# Patient Record
Sex: FEMALE | Race: WHITE | NOT HISPANIC OR LATINO | URBAN - METROPOLITAN AREA
[De-identification: names, ages, dates, MRNs, and addresses within clinical notes are randomized per-mention and may not be internally consistent; named-entity substitution may affect disease eponyms.]

---

## 2023-07-25 ENCOUNTER — TELEPHONE (OUTPATIENT)
Dept: PRIMARY CARE | Facility: CLINIC | Age: 58
End: 2023-07-25

## 2023-07-25 NOTE — TELEPHONE ENCOUNTER
Pt is due for her mammogram. According to her chart, she has never had one.    Please let me know when this is ordered so I can call pt.

## 2025-02-17 ENCOUNTER — HOSPITAL ENCOUNTER (OUTPATIENT)
Dept: RADIOLOGY | Facility: CLINIC | Age: 60
Discharge: HOME | End: 2025-02-17
Payer: COMMERCIAL

## 2025-02-17 VITALS — WEIGHT: 260.14 LBS | HEIGHT: 65 IN | BODY MASS INDEX: 43.34 KG/M2

## 2025-02-17 DIAGNOSIS — Z12.31 ENCOUNTER FOR SCREENING MAMMOGRAM FOR MALIGNANT NEOPLASM OF BREAST: ICD-10-CM

## 2025-02-17 PROCEDURE — 77063 BREAST TOMOSYNTHESIS BI: CPT | Performed by: STUDENT IN AN ORGANIZED HEALTH CARE EDUCATION/TRAINING PROGRAM

## 2025-02-17 PROCEDURE — 77067 SCR MAMMO BI INCL CAD: CPT | Performed by: STUDENT IN AN ORGANIZED HEALTH CARE EDUCATION/TRAINING PROGRAM

## 2025-02-17 PROCEDURE — 77067 SCR MAMMO BI INCL CAD: CPT

## 2025-03-20 ENCOUNTER — APPOINTMENT (OUTPATIENT)
Dept: OBSTETRICS AND GYNECOLOGY | Facility: CLINIC | Age: 60
End: 2025-03-20
Payer: COMMERCIAL

## 2025-03-20 VITALS — WEIGHT: 256 LBS | BODY MASS INDEX: 42.6 KG/M2 | DIASTOLIC BLOOD PRESSURE: 104 MMHG | SYSTOLIC BLOOD PRESSURE: 175 MMHG

## 2025-03-20 DIAGNOSIS — Z12.11 COLON CANCER SCREENING: Primary | ICD-10-CM

## 2025-03-20 DIAGNOSIS — Z01.411 ENCOUNTER FOR WELL WOMAN EXAM WITH ABNORMAL FINDINGS: ICD-10-CM

## 2025-03-20 DIAGNOSIS — N95.2 ATROPHIC VULVOVAGINITIS: ICD-10-CM

## 2025-03-20 DIAGNOSIS — Z12.31 VISIT FOR SCREENING MAMMOGRAM: ICD-10-CM

## 2025-03-20 DIAGNOSIS — Z01.419 WELL WOMAN EXAM: ICD-10-CM

## 2025-03-20 PROCEDURE — 99386 PREV VISIT NEW AGE 40-64: CPT | Performed by: OBSTETRICS & GYNECOLOGY

## 2025-03-20 RX ORDER — NYSTATIN AND TRIAMCINOLONE ACETONIDE 100000; 1 [USP'U]/G; MG/G
OINTMENT TOPICAL EVERY 24 HOURS
Qty: 60 G | Refills: 11 | Status: SHIPPED | OUTPATIENT
Start: 2025-03-20

## 2025-03-20 RX ORDER — ESTRADIOL 0.1 MG/G
1 CREAM VAGINAL 2 TIMES WEEKLY
Qty: 42 G | Refills: 3 | Status: SHIPPED | OUTPATIENT
Start: 2025-03-20

## 2025-03-20 NOTE — PROGRESS NOTES
Last pap: 2017, hx of hysterectomy   Last mamm: 2025  Sexually active: Yes  Self breast exam: Yes      CC:    Chief Complaint   Patient presents with    Annual Exam         HPI:  Omaira Keene is here for a routine GYN examination.   S/p hysterectomy, still has ovaries, in menopause.  Having vaginal dryness, urinary urgency and vulvar irritation.  No bleeding, no hematuria.  No dyuria.  Does not feel like UTI.  Occasional incontinence.  Due for colonoscopy needs PCP.  Had normal mammogram in feb.        ROS:    GI - no hematochezia/constipation/diarrhea  URO - no hematuria/dysuria/urinary frequency  GYN - no vaginal discharge/dyspareunia/dysmenorrhea/pelvic pain  PSYCH - mood OK    PMH:   Past Medical History:   Diagnosis Date    Carpal tunnel syndrome, bilateral upper limbs 2016    Bilateral carpal tunnel syndrome    Paresthesia of skin 2016    Tingling    Personal history of colonic polyps     History of adenomatous polyp of colon    Personal history of other diseases of the musculoskeletal system and connective tissue     History of degenerative disc disease    Personal history of other specified conditions     History of atypical nevus       PSH:   Past Surgical History:   Procedure Laterality Date     SECTION, CLASSIC  2016     Section    OOPHORECTOMY  2018    OTHER SURGICAL HISTORY  2019    Hysterectomy       OB History    Para Term  AB Living   1 1 1         SAB IAB Ectopic Multiple Live Births                  # Outcome Date GA Lbr Beck/2nd Weight Sex Type Anes PTL Lv   1 Term              1 c/s  Soc:   Social History     Social History Narrative    Not on file     Occupation/education: retired,  from home      Fam Hx: No family history on file.      PHYSICAL EXAM:  BP (!) 175/104 (BP Location: Left arm, Patient Position: Sitting)   Wt 116 kg (256 lb)   BMI 42.60 kg/m²   GEN:  A&O, NAD  HEENT  head NC/AT, conjunctiva clear, no  visible goiter  CV:  regular pulse rate and rhythm, no visible JVD  RESP:  symmetric respirations, no audible wheezing  ABD:  NT/ND, soft, no palpable masses  URO:  normal urethra, no bladder TTP  GYN:    EGBUS: labia major slightly erythematous symmetric into genitocrural fold, vagina atrophic first degree rectocele, vaginal cuff normal nontender, adnexa mobile and NT/NE  BREAST:  no masses or TTP, no skin lesions or nipple discharge  DERM:  no hirsutism or acne   PSYCH:  normal affect, non-anxious        IMPRESSION/PLAN:  Problem List Items Addressed This Visit    None  Visit Diagnoses       Colon cancer screening    -  Primary    Relevant Orders    Colonoscopy Screening; Average Risk Patient    Visit for screening mammogram        Relevant Orders    BI mammo bilateral screening tomosynthesis    Well woman exam        Relevant Orders    BI mammo bilateral screening tomosynthesis    Atrophic vulvovaginitis        Relevant Medications    nystatin-triamcinolone (Mycolog II) ointment    estradiol (Estrace) 0.01 % (0.1 mg/gram) vaginal cream          D/w pt vaginal atrophy affects urinary urgency.  Will start with vaginal estrogen, nystatin to vulvar.  Fu 4 weeks if not improving consider pelvic floor therapy, ua/culture  Referred to PCP for HTN    Yesenia Coe MD

## 2025-03-22 ENCOUNTER — TELEPHONE (OUTPATIENT)
Dept: GASTROENTEROLOGY | Facility: CLINIC | Age: 60
End: 2025-03-22
Payer: COMMERCIAL

## 2025-04-04 PROBLEM — Z15.89 HLA B27 POSITIVE: Status: ACTIVE | Noted: 2025-04-04

## 2025-04-04 PROBLEM — D50.9 IRON DEFICIENCY ANEMIA: Status: ACTIVE | Noted: 2025-04-04

## 2025-04-04 PROBLEM — I10 ESSENTIAL HYPERTENSION: Status: ACTIVE | Noted: 2025-04-04

## 2025-04-04 PROBLEM — R73.03 PREDIABETES: Status: ACTIVE | Noted: 2025-04-04

## 2025-04-04 PROBLEM — E04.2 MULTINODULAR GOITER: Status: ACTIVE | Noted: 2025-04-04

## 2025-04-04 PROBLEM — G47.30 SLEEP APNEA IN ADULT: Status: ACTIVE | Noted: 2025-04-04

## 2025-04-08 ENCOUNTER — APPOINTMENT (OUTPATIENT)
Dept: PRIMARY CARE | Facility: CLINIC | Age: 60
End: 2025-04-08
Payer: COMMERCIAL

## 2025-04-08 VITALS
RESPIRATION RATE: 18 BRPM | HEART RATE: 74 BPM | BODY MASS INDEX: 43.49 KG/M2 | HEIGHT: 65 IN | WEIGHT: 261 LBS | SYSTOLIC BLOOD PRESSURE: 160 MMHG | TEMPERATURE: 98.2 F | DIASTOLIC BLOOD PRESSURE: 92 MMHG | OXYGEN SATURATION: 98 %

## 2025-04-08 DIAGNOSIS — E66.813 CLASS 3 SEVERE OBESITY DUE TO EXCESS CALORIES WITH SERIOUS COMORBIDITY AND BODY MASS INDEX (BMI) OF 40.0 TO 44.9 IN ADULT: Primary | ICD-10-CM

## 2025-04-08 DIAGNOSIS — K80.20 CALCULUS OF GALLBLADDER WITHOUT CHOLECYSTITIS WITHOUT OBSTRUCTION: ICD-10-CM

## 2025-04-08 DIAGNOSIS — Z23 NEED FOR ZOSTER VACCINATION: ICD-10-CM

## 2025-04-08 DIAGNOSIS — I10 PRIMARY HYPERTENSION: ICD-10-CM

## 2025-04-08 DIAGNOSIS — Z01.83 BLOOD TYPING ENCOUNTER: ICD-10-CM

## 2025-04-08 DIAGNOSIS — J45.20 MILD INTERMITTENT ASTHMA WITHOUT COMPLICATION (HHS-HCC): ICD-10-CM

## 2025-04-08 DIAGNOSIS — Z15.89 HLA B27 POSITIVE: ICD-10-CM

## 2025-04-08 DIAGNOSIS — Z00.00 HEALTHCARE MAINTENANCE: ICD-10-CM

## 2025-04-08 DIAGNOSIS — E66.01 CLASS 3 SEVERE OBESITY DUE TO EXCESS CALORIES WITH SERIOUS COMORBIDITY AND BODY MASS INDEX (BMI) OF 40.0 TO 44.9 IN ADULT: Primary | ICD-10-CM

## 2025-04-08 DIAGNOSIS — R73.03 PREDIABETES: ICD-10-CM

## 2025-04-08 PROBLEM — E88.810 METABOLIC SYNDROME: Status: ACTIVE | Noted: 2025-04-08

## 2025-04-08 PROCEDURE — 3077F SYST BP >= 140 MM HG: CPT

## 2025-04-08 PROCEDURE — 86900 BLOOD TYPING SEROLOGIC ABO: CPT

## 2025-04-08 PROCEDURE — 90471 IMMUNIZATION ADMIN: CPT

## 2025-04-08 PROCEDURE — 90750 HZV VACC RECOMBINANT IM: CPT

## 2025-04-08 PROCEDURE — 3080F DIAST BP >= 90 MM HG: CPT

## 2025-04-08 PROCEDURE — 86901 BLOOD TYPING SEROLOGIC RH(D): CPT

## 2025-04-08 PROCEDURE — 3008F BODY MASS INDEX DOCD: CPT

## 2025-04-08 PROCEDURE — 99204 OFFICE O/P NEW MOD 45 MIN: CPT

## 2025-04-08 RX ORDER — ALBUTEROL SULFATE 90 UG/1
2 INHALANT RESPIRATORY (INHALATION) EVERY 4 HOURS PRN
Qty: 8.5 G | Refills: 1 | Status: SHIPPED | OUTPATIENT
Start: 2025-04-08 | End: 2026-04-08

## 2025-04-08 RX ORDER — LOSARTAN POTASSIUM 50 MG/1
50 TABLET ORAL DAILY
Qty: 30 TABLET | Refills: 0 | Status: SHIPPED | OUTPATIENT
Start: 2025-04-08

## 2025-04-08 ASSESSMENT — PATIENT HEALTH QUESTIONNAIRE - PHQ9
1. LITTLE INTEREST OR PLEASURE IN DOING THINGS: NOT AT ALL
2. FEELING DOWN, DEPRESSED OR HOPELESS: NOT AT ALL
SUM OF ALL RESPONSES TO PHQ9 QUESTIONS 1 AND 2: 0

## 2025-04-08 NOTE — PROGRESS NOTES
"Subjective   Patient ID: Omaira Keene is a 59 y.o. female who presents for Establish Care (New patient to establish. /Patient is fasting.).    Patient here today to establish care.  Blood pressure elevated at today's visit, patient does state that she has had a history of blood pressures previously.    Hx of gestational HTN: on labetalol post-partum.  However otherwise had not been treated for other hypertension.  Denies any associated symptoms: No headache, SOB, CP, visual changes.  Does not check blood pressure at home.  Other stated concerns include weight related concerns       Review of Systems    Objective   BP (!) 160/92 (BP Location: Right arm, Patient Position: Sitting)   Pulse 74   Temp 36.8 °C (98.2 °F)   Resp 18   Ht 1.651 m (5' 5\")   Wt 118 kg (261 lb)   SpO2 98%   BMI 43.43 kg/m²     Physical Exam    Assessment/Plan   {Assess/PlanSmartLinks:98271}       " A1c (Completed)    Follow Up In [facility] - PCP    HLA B27 positive Z15.89    Calculus of gallbladder without cholecystitis without obstruction K80.20    Mild intermittent asthma without complication ([facility]) J45.20    Relevant Medications    albuterol (ProAir HFA) 90 mcg/actuation inhaler    Class 3 severe obesity due to excess calories with serious comorbidity and body mass index (BMI) of 40.0 to 44.9 in adult - Primary E66.813, E66.01, Z68.41    Relevant Orders    Lipid Panel (Completed)    TSH with reflex to Free T4 if abnormal (Completed)     Other Visit Diagnoses         Codes      Primary hypertension     I10    Relevant Medications    losartan (Cozaar) 50 mg tablet    Other Relevant Orders    TSH with reflex to Free T4 if abnormal (Completed)    Comprehensive Metabolic Panel (Completed)    CBC and Auto Differential (Completed)      Need for zoster vaccination     Z23    Relevant Orders    Zoster vaccine, recombinant, adult (SHINGRIX) (Completed)      Blood typing encounter     Z01.83    Relevant Orders    ABO/Rh (Completed)      Healthcare maintenance     Z00.00    Relevant Orders    Hepatitis C Antibody (Completed)    HIV 1/2 Antigen/Antibody Screen with Reflex to Confirmation (Completed)        Will defer annual physical at this time to more appropriately address blood pressure.  No immediate need for hospitalization given asymptomatic hypertension.  Will evaluate kidney function.  Start losartan discussed R/B/A SE associated with this medication.  Additionally requesting blood typing, orders placed  Will order annual labs to be completed as soon as she is able.  Refill provided for albuterol inhaler, patient uses sparingly.  Counseled patient extensively on diet and exercise, recommend patient significantly decrease caloric intake.  Should patient develop any symptoms associated with elevated blood pressure, or significant increases in blood pressure recommend she contact office for further  instruction.  Return to office in 1 month for blood pressure recheck.  Otherwise we will schedule III month follow-up for weight rechecks.    Raul Florence, DO

## 2025-04-09 LAB
ABO GROUP (TYPE) IN BLOOD: NORMAL
RH FACTOR (ANTIGEN D): NORMAL

## 2025-04-10 PROBLEM — E03.8 SUBCLINICAL HYPOTHYROIDISM: Status: ACTIVE | Noted: 2025-04-10

## 2025-04-11 LAB
ALBUMIN SERPL-MCNC: 4.3 G/DL (ref 3.6–5.1)
ALP SERPL-CCNC: 57 U/L (ref 37–153)
ALT SERPL-CCNC: 22 U/L (ref 6–29)
ANION GAP SERPL CALCULATED.4IONS-SCNC: 8 MMOL/L (CALC) (ref 7–17)
AST SERPL-CCNC: 18 U/L (ref 10–35)
BASOPHILS # BLD AUTO: 30 CELLS/UL (ref 0–200)
BASOPHILS NFR BLD AUTO: 0.5 %
BILIRUB SERPL-MCNC: 0.3 MG/DL (ref 0.2–1.2)
BUN SERPL-MCNC: 15 MG/DL (ref 7–25)
CALCIUM SERPL-MCNC: 9.1 MG/DL (ref 8.6–10.4)
CHLORIDE SERPL-SCNC: 106 MMOL/L (ref 98–110)
CHOLEST SERPL-MCNC: 181 MG/DL
CHOLEST/HDLC SERPL: 2.7 (CALC)
CO2 SERPL-SCNC: 25 MMOL/L (ref 20–32)
CREAT SERPL-MCNC: 0.72 MG/DL (ref 0.5–1.05)
EGFRCR SERPLBLD CKD-EPI 2021: 95 ML/MIN/1.73M2
EOSINOPHIL # BLD AUTO: 83 CELLS/UL (ref 15–500)
EOSINOPHIL NFR BLD AUTO: 1.4 %
ERYTHROCYTE [DISTWIDTH] IN BLOOD BY AUTOMATED COUNT: 13.5 % (ref 11–15)
EST. AVERAGE GLUCOSE BLD GHB EST-MCNC: 137 MG/DL
EST. AVERAGE GLUCOSE BLD GHB EST-SCNC: 7.6 MMOL/L
GLUCOSE SERPL-MCNC: 112 MG/DL (ref 65–99)
HBA1C MFR BLD: 6.4 % OF TOTAL HGB
HCT VFR BLD AUTO: 41 % (ref 35–45)
HCV AB SERPL QL IA: NORMAL
HDLC SERPL-MCNC: 67 MG/DL
HGB BLD-MCNC: 13.6 G/DL (ref 11.7–15.5)
HIV 1+2 AB+HIV1 P24 AG SERPL QL IA: NORMAL
LDLC SERPL CALC-MCNC: 96 MG/DL (CALC)
LYMPHOCYTES # BLD AUTO: 2000 CELLS/UL (ref 850–3900)
LYMPHOCYTES NFR BLD AUTO: 33.9 %
MCH RBC QN AUTO: 27.8 PG (ref 27–33)
MCHC RBC AUTO-ENTMCNC: 33.2 G/DL (ref 32–36)
MCV RBC AUTO: 83.7 FL (ref 80–100)
MONOCYTES # BLD AUTO: 242 CELLS/UL (ref 200–950)
MONOCYTES NFR BLD AUTO: 4.1 %
NEUTROPHILS # BLD AUTO: 3546 CELLS/UL (ref 1500–7800)
NEUTROPHILS NFR BLD AUTO: 60.1 %
NONHDLC SERPL-MCNC: 114 MG/DL (CALC)
PLATELET # BLD AUTO: 203 THOUSAND/UL (ref 140–400)
PMV BLD REES-ECKER: 10.8 FL (ref 7.5–12.5)
POTASSIUM SERPL-SCNC: 4.3 MMOL/L (ref 3.5–5.3)
PROT SERPL-MCNC: 7 G/DL (ref 6.1–8.1)
RBC # BLD AUTO: 4.9 MILLION/UL (ref 3.8–5.1)
SODIUM SERPL-SCNC: 139 MMOL/L (ref 135–146)
T4 FREE SERPL-MCNC: 1.1 NG/DL (ref 0.8–1.8)
TRIGL SERPL-MCNC: 85 MG/DL
TSH SERPL-ACNC: 4.53 MIU/L (ref 0.4–4.5)
WBC # BLD AUTO: 5.9 THOUSAND/UL (ref 3.8–10.8)

## 2025-04-16 DIAGNOSIS — Z12.11 COLON CANCER SCREENING: ICD-10-CM

## 2025-04-16 RX ORDER — SODIUM, POTASSIUM,MAG SULFATES 17.5-3.13G
1 SOLUTION, RECONSTITUTED, ORAL ORAL EVERY 12 HOURS
Qty: 2 EACH | Refills: 0 | Status: SHIPPED | OUTPATIENT
Start: 2025-04-16

## 2025-04-17 ENCOUNTER — APPOINTMENT (OUTPATIENT)
Dept: OBSTETRICS AND GYNECOLOGY | Facility: CLINIC | Age: 60
End: 2025-04-17
Payer: COMMERCIAL

## 2025-04-17 VITALS
HEIGHT: 65 IN | DIASTOLIC BLOOD PRESSURE: 85 MMHG | BODY MASS INDEX: 43.32 KG/M2 | WEIGHT: 260 LBS | SYSTOLIC BLOOD PRESSURE: 167 MMHG

## 2025-04-17 DIAGNOSIS — R32 URINARY INCONTINENCE, UNSPECIFIED TYPE: ICD-10-CM

## 2025-04-17 DIAGNOSIS — N95.2 ATROPHIC VULVOVAGINITIS: Primary | ICD-10-CM

## 2025-04-17 PROCEDURE — 1036F TOBACCO NON-USER: CPT | Performed by: OBSTETRICS & GYNECOLOGY

## 2025-04-17 PROCEDURE — 3008F BODY MASS INDEX DOCD: CPT | Performed by: OBSTETRICS & GYNECOLOGY

## 2025-04-17 PROCEDURE — 3079F DIAST BP 80-89 MM HG: CPT | Performed by: OBSTETRICS & GYNECOLOGY

## 2025-04-17 PROCEDURE — 99213 OFFICE O/P EST LOW 20 MIN: CPT | Performed by: OBSTETRICS & GYNECOLOGY

## 2025-04-17 PROCEDURE — 3077F SYST BP >= 140 MM HG: CPT | Performed by: OBSTETRICS & GYNECOLOGY

## 2025-04-17 NOTE — PROGRESS NOTES
"GYNECOLOGY PROGRESS NOTE          CC:     Chief Complaint   Patient presents with    Follow-up        HPI:  Omaira Keene is here to follow up on vulvitis.  Has been using nystatin/triamcinolone on vulva and estrace vaginally for about a month.  Slightly better but still irritated    Yesenia Coe MD  Medical History[1]  Surgical History[2]  Social History[3]  Family History[4]   [unfilled]    ROS:  GYN - see HPI        PHYSICAL EXAM:  /85 (BP Location: Left arm, Patient Position: Sitting, BP Cuff Size: Large adult)   Ht 1.651 m (5' 5\")   Wt 118 kg (260 lb)   BMI 43.27 kg/m²   GEN:  A&O, NAD  HEENT:  head HC/AT, no visible goiter  PSYCH:  normal affect, non-anxious  EGBUS; labia majora mildly erythematous, symmetric no discrete lesions, inner thighs red and scaly symmetric    IMPRESSION/PLAN:    Problem List Items Addressed This Visit    None  Visit Diagnoses         Urinary incontinence, unspecified type        Relevant Orders    POCT urinalysis dipstick manually resulted    Urine culture            Suspect more contact derm, would add sitz bath at night for 10 min and aquaphor, d/w pt important that I see this to resolution, fu 3 weeks             [1]   Past Medical History:  Diagnosis Date    Carpal tunnel syndrome, bilateral upper limbs 2016    Bilateral carpal tunnel syndrome    Paresthesia of skin 2016    Tingling    Personal history of colonic polyps     History of adenomatous polyp of colon    Personal history of other diseases of the musculoskeletal system and connective tissue     History of degenerative disc disease    Personal history of other specified conditions     History of atypical nevus   [2]   Past Surgical History:  Procedure Laterality Date     SECTION, CLASSIC  2016     Section    OOPHORECTOMY  2018    OTHER SURGICAL HISTORY  2019    Hysterectomy   [3]   Social History  Tobacco Use    Smoking status: Never    Smokeless tobacco: Never "   Substance Use Topics    Alcohol use: Yes    Drug use: Never   [4]   Family History  Problem Relation Name Age of Onset    No Known Problems Mother      Diabetes Father      Lung cancer Father

## 2025-05-07 ENCOUNTER — APPOINTMENT (OUTPATIENT)
Dept: PRIMARY CARE | Facility: CLINIC | Age: 60
End: 2025-05-07
Payer: COMMERCIAL

## 2025-05-09 ENCOUNTER — APPOINTMENT (OUTPATIENT)
Dept: OBSTETRICS AND GYNECOLOGY | Facility: CLINIC | Age: 60
End: 2025-05-09
Payer: COMMERCIAL

## 2025-05-09 VITALS
DIASTOLIC BLOOD PRESSURE: 111 MMHG | WEIGHT: 259.8 LBS | SYSTOLIC BLOOD PRESSURE: 186 MMHG | BODY MASS INDEX: 43.23 KG/M2

## 2025-05-09 DIAGNOSIS — L30.4 INTERTRIGO OF GENITAL LABIA: Primary | ICD-10-CM

## 2025-05-09 PROCEDURE — 3077F SYST BP >= 140 MM HG: CPT | Performed by: OBSTETRICS & GYNECOLOGY

## 2025-05-09 PROCEDURE — 1036F TOBACCO NON-USER: CPT | Performed by: OBSTETRICS & GYNECOLOGY

## 2025-05-09 PROCEDURE — 99213 OFFICE O/P EST LOW 20 MIN: CPT | Performed by: OBSTETRICS & GYNECOLOGY

## 2025-05-09 PROCEDURE — 3080F DIAST BP >= 90 MM HG: CPT | Performed by: OBSTETRICS & GYNECOLOGY

## 2025-05-09 ASSESSMENT — ENCOUNTER SYMPTOMS
OCCASIONAL FEELINGS OF UNSTEADINESS: 0
DEPRESSION: 0
LOSS OF SENSATION IN FEET: 0

## 2025-05-09 ASSESSMENT — ANXIETY QUESTIONNAIRES
4. TROUBLE RELAXING: NOT AT ALL
GAD7 TOTAL SCORE: 0
5. BEING SO RESTLESS THAT IT IS HARD TO SIT STILL: NOT AT ALL
1. FEELING NERVOUS, ANXIOUS, OR ON EDGE: NOT AT ALL
2. NOT BEING ABLE TO STOP OR CONTROL WORRYING: NOT AT ALL
7. FEELING AFRAID AS IF SOMETHING AWFUL MIGHT HAPPEN: NOT AT ALL
6. BECOMING EASILY ANNOYED OR IRRITABLE: NOT AT ALL
3. WORRYING TOO MUCH ABOUT DIFFERENT THINGS: NOT AT ALL

## 2025-05-09 NOTE — PROGRESS NOTES
GYNECOLOGY PROGRESS NOTE          CC:   No chief complaint on file.       HPI:  Omaira Keene is here to follow up on vulvitis.  Using sitz bath, vaginal estrogen and either desitin or nystatin ointment and feels better.  Did flare when not using sitz bath.    Yesenia Coe MD  Medical History[1]  Surgical History[2]  Social History[3]  Family History[4]   [unfilled]    ROS:  GYN - see HPI        PHYSICAL EXAM:  BP (!) 186/111   Wt 118 kg (259 lb 12.8 oz)   BMI 43.23 kg/m²   GEN:  A&O, NAD  HEENT:  head HC/AT, no visible goiter  PSYCH:  normal affect, non-anxious  EGBUS-labia majora and minora normal, no rash      IMPRESSION/PLAN:    Problem List Items Addressed This Visit       Intertrigo of genital labia - Primary       Improved tremendously with sitz bath and barrier ointments, continue currently regimen , follow up yearly or as needed.             [1]   Past Medical History:  Diagnosis Date    Carpal tunnel syndrome, bilateral upper limbs 2016    Bilateral carpal tunnel syndrome    Paresthesia of skin 2016    Tingling    Personal history of colonic polyps     History of adenomatous polyp of colon    Personal history of other diseases of the musculoskeletal system and connective tissue     History of degenerative disc disease    Personal history of other specified conditions     History of atypical nevus   [2]   Past Surgical History:  Procedure Laterality Date     SECTION, CLASSIC  2016     Section    OOPHORECTOMY  2018    OTHER SURGICAL HISTORY  2019    Hysterectomy   [3]   Social History  Tobacco Use    Smoking status: Never    Smokeless tobacco: Never   Vaping Use    Vaping status: Never Used   Substance Use Topics    Alcohol use: Yes     Alcohol/week: 5.0 standard drinks of alcohol     Types: 5 Standard drinks or equivalent per week    Drug use: Never   [4]   Family History  Problem Relation Name Age of Onset    No Known Problems Mother      Diabetes Father       Lung cancer Father

## 2025-06-19 ENCOUNTER — ANESTHESIA EVENT (OUTPATIENT)
Dept: GASTROENTEROLOGY | Facility: EXTERNAL LOCATION | Age: 60
End: 2025-06-19

## 2025-07-02 ENCOUNTER — APPOINTMENT (OUTPATIENT)
Dept: GASTROENTEROLOGY | Facility: EXTERNAL LOCATION | Age: 60
End: 2025-07-02
Payer: COMMERCIAL

## 2025-07-02 ENCOUNTER — ANESTHESIA (OUTPATIENT)
Dept: GASTROENTEROLOGY | Facility: EXTERNAL LOCATION | Age: 60
End: 2025-07-02

## 2025-07-02 VITALS
DIASTOLIC BLOOD PRESSURE: 80 MMHG | HEIGHT: 65 IN | TEMPERATURE: 97.3 F | HEART RATE: 77 BPM | OXYGEN SATURATION: 96 % | SYSTOLIC BLOOD PRESSURE: 131 MMHG | WEIGHT: 250 LBS | BODY MASS INDEX: 41.65 KG/M2 | RESPIRATION RATE: 16 BRPM

## 2025-07-02 DIAGNOSIS — Z12.11 COLON CANCER SCREENING: ICD-10-CM

## 2025-07-02 PROCEDURE — 45380 COLONOSCOPY AND BIOPSY: CPT | Performed by: INTERNAL MEDICINE

## 2025-07-02 RX ORDER — ONDANSETRON HYDROCHLORIDE 2 MG/ML
4 INJECTION, SOLUTION INTRAVENOUS ONCE AS NEEDED
Status: DISCONTINUED | OUTPATIENT
Start: 2025-07-02 | End: 2025-07-03 | Stop reason: HOSPADM

## 2025-07-02 RX ORDER — PROPOFOL 10 MG/ML
INJECTION, EMULSION INTRAVENOUS AS NEEDED
Status: DISCONTINUED | OUTPATIENT
Start: 2025-07-02 | End: 2025-07-02

## 2025-07-02 RX ORDER — SODIUM CHLORIDE 9 MG/ML
INJECTION, SOLUTION INTRAVENOUS CONTINUOUS PRN
Status: DISCONTINUED | OUTPATIENT
Start: 2025-07-02 | End: 2025-07-02

## 2025-07-02 RX ORDER — LIDOCAINE HYDROCHLORIDE 20 MG/ML
INJECTION, SOLUTION INFILTRATION; PERINEURAL AS NEEDED
Status: DISCONTINUED | OUTPATIENT
Start: 2025-07-02 | End: 2025-07-02

## 2025-07-02 RX ADMIN — SODIUM CHLORIDE: 9 INJECTION, SOLUTION INTRAVENOUS at 08:40

## 2025-07-02 RX ADMIN — PROPOFOL 100 MG: 10 INJECTION, EMULSION INTRAVENOUS at 09:23

## 2025-07-02 RX ADMIN — PROPOFOL 50 MG: 10 INJECTION, EMULSION INTRAVENOUS at 09:35

## 2025-07-02 RX ADMIN — PROPOFOL 50 MG: 10 INJECTION, EMULSION INTRAVENOUS at 09:29

## 2025-07-02 RX ADMIN — LIDOCAINE HYDROCHLORIDE 3 ML: 20 INJECTION, SOLUTION INFILTRATION; PERINEURAL at 09:23

## 2025-07-02 SDOH — HEALTH STABILITY: MENTAL HEALTH: CURRENT SMOKER: 0

## 2025-07-02 ASSESSMENT — PAIN SCALES - GENERAL
PAINLEVEL_OUTOF10: 0 - NO PAIN
PAIN_LEVEL: 0
PAINLEVEL_OUTOF10: 0 - NO PAIN

## 2025-07-02 ASSESSMENT — PAIN - FUNCTIONAL ASSESSMENT
PAIN_FUNCTIONAL_ASSESSMENT: 0-10

## 2025-07-02 ASSESSMENT — COLUMBIA-SUICIDE SEVERITY RATING SCALE - C-SSRS
2. HAVE YOU ACTUALLY HAD ANY THOUGHTS OF KILLING YOURSELF?: NO
1. IN THE PAST MONTH, HAVE YOU WISHED YOU WERE DEAD OR WISHED YOU COULD GO TO SLEEP AND NOT WAKE UP?: NO
6. HAVE YOU EVER DONE ANYTHING, STARTED TO DO ANYTHING, OR PREPARED TO DO ANYTHING TO END YOUR LIFE?: NO

## 2025-07-02 NOTE — ANESTHESIA PREPROCEDURE EVALUATION
Patient: Omaira Keene    Procedure Information       Date/Time: 25 0900    Scheduled providers: Yadi STAFFORD MD    Procedure: COLONOSCOPY    Location: [facility]            Relevant Problems   Cardiac   (+) Essential hypertension      Pulmonary   (+) Mild intermittent asthma without complication ([facility])      Liver   (+) Calculus of gallbladder without cholecystitis without obstruction      Endocrine   (+) Class 3 severe obesity due to excess calories with serious comorbidity and body mass index (BMI) of 40.0 to 44.9 in adult   (+) Multinodular goiter   (+) Subclinical hypothyroidism      Hematology   (+) Iron deficiency anemia       Clinical information reviewed:   Tobacco  Allergies  Meds  Problems  Med Hx  Surg Hx   Fam Hx  Soc   Hx        NPO Detail:  NPO/Void Status  Carbohydrate Drink Given Prior to Surgery? : Y  Date of Last Liquid: 25  Time of Last Liquid: 0230  Date of Last Solid: 25  Time of Last Solid: 1800  Last Intake Type: GI prep  Time of Last Void: 0834         Physical Exam    Airway  Mallampati: II  TM distance: >3 FB  Neck ROM: full  Mouth openin finger widths     Cardiovascular - normal exam  Rhythm: regular  Rate: normal     Dental - normal exam     Pulmonary - normal examBreath sounds clear to auscultation     Abdominal - normal exam(+) obese  Abdomen: soft             Anesthesia Plan    History of general anesthesia?: yes  History of complications of general anesthesia?: no    ASA 3     MAC     The patient is not a current smoker.  Education provided regarding risk of obstructive sleep apnea.  intravenous induction   Anesthetic plan and risks discussed with patient.    Plan discussed with CRNA.

## 2025-07-02 NOTE — ANESTHESIA POSTPROCEDURE EVALUATION
Patient: Omaira Keene    Procedure Summary       Date: 07/02/25 Room / Location: [facility]    Anesthesia Start: 0920 Anesthesia Stop: 0944    Procedure: COLONOSCOPY Diagnosis: Colon cancer screening    Scheduled Providers: Yadi STAFFORD MD Responsible Provider: AUGUSTA Vázquez    Anesthesia Type: MAC ASA Status: 3            Anesthesia Type: MAC    Vitals Value Taken Time   /67 07/02/25 09:42   Temp 36.3 °C (97.3 °F) 07/02/25 09:42   Pulse 71 07/02/25 09:42   Resp 15 07/02/25 09:42   SpO2 93 % 07/02/25 09:42       Anesthesia Post Evaluation    Patient location during evaluation: bedside  Patient participation: complete - patient participated  Level of consciousness: awake  Pain score: 0  Pain management: adequate  Airway patency: patent  Cardiovascular status: acceptable  Respiratory status: acceptable  Hydration status: acceptable  Postoperative Nausea and Vomiting: none        There were no known notable events for this encounter.

## 2025-07-02 NOTE — DISCHARGE INSTRUCTIONS
Patient Instructions Post Endoscopy Procedure      The anesthetics, sedatives or narcotics which were given to you today will be acting in your body for the next 24 hours, so you might feel a little sleepy or groggy.  This feeling should slowly wear off. Carefully read and follow the instructions.     You received sedation today:  - Do not drive or operate any machinery or power tools of any kind.   - No alcoholic beverages today, not even beer or wine.  - Do not make any important decisions or sign any legal documents.  - No over the counter medications that contain alcohol or that may cause drowsiness.    While it is common to experience mild to moderate abdominal distention, gas, or belching after your procedure, if any of these symptoms occur following discharge from the GI Lab or within one week of having your procedure, call the [facility] to be advised whether a visit to your nearest Urgent Care or Emergency Department is indicated.  Take this paper with you if you go.   - If you develop an allergic reaction to the medications that were given during your procedure such as difficulty breathing, rash, hives, severe nausea, vomiting or lightheadedness.  - If you experience chest pain, shortness of breath, severe abdominal pain, fevers and chills.  -If you develop signs and symptoms of bleeding such as blood in your spit, if your stools turn black, tarry, or bloody  - If you have not urinated within 8 hours following your procedure.  - If your IV site becomes painful, red, inflamed, or looks infected.        Your physician recommends the additional following instructions:    -You have a contact number available for emergencies. The signs and symptoms of potential delayed complications were discussed with you. You may return to normal activities tomorrow.  -Resume your previous diet or other if specified.  -Continue your present medications.   -We are waiting for your pathology results, if  applicable. The results will be available in [facility]. I will send you a message with any recommendations.  -The findings and recommendations have been discussed with you and/or family.  -Please see Medication Reconciliation Form for new medication/medications prescribed.     If you experience any problems or have any questions following discharge from the GI Lab, please call: [phone] from 7 am- 4:30 pm.  In the event of an emergency please go to the closest Emergency Department or call Dr. Mackenzie at [phone]

## 2025-07-02 NOTE — H&P
Outpatient Hospital Procedure    Patient Profile-Procedures  Initial Info  Patient Demographics  Name Omaira Keene  Date of Birth 1965  MRN [id number]  Address   [address] [id number] [place] [id number]    Primary Phone Number [phone]  Secondary Phone Number    PCP Sowmya Mackenzie RONAL    Procedures   Colonoscopy      Indication:  Surveillance - TA 2017    Anesthesia Indication: morbid obesity    Primary contact name and number   Extended Emergency Contact Information  Primary Emergency Contact: Yecenia,Alcides  Home Phone: [phone]  Work Phone: [phone]  Relation: Spouse    General Health  Weight   Vitals:    07/02/25 0833   Weight: 113 kg (250 lb)     BMI Body mass index is 41.6 kg/m².    Allergies  RX Allergies[1]    Past Medical History   Medical History[2]    Provider assessment  Diagnosis  Medication Reviewed - yes  Prior to Admission medications    Medication Sig Start Date End Date Taking? Authorizing Provider   albuterol (ProAir HFA) 90 mcg/actuation inhaler Inhale 2 puffs every 4 hours if needed for wheezing or shortness of breath. 4/8/25 4/8/26 Yes Raul Florence DO   estradiol (Estrace) 0.01 % (0.1 mg/gram) vaginal cream Insert 0.25 Applicatorfuls (1 g) into the vagina 2 times a week. 3/20/25  Yes Yesenia STAFFORD MD   sodium,potassium,mag sulfates (Suprep Bowel Prep Kit) 17.5-3.13-1.6 gram solution Take 1 bottle by mouth every 12 hours. 4/16/25 7/2/25 Yes Yadi STAFFORD MD   losartan (Cozaar) 50 mg tablet Take 1 tablet (50 mg) by mouth once daily.  Patient not taking: Reported on 4/17/2025 4/8/25   Raul Florence DO   nystatin-triamcinolone (Mycolog II) ointment Apply topically once every 24 hours. TO vulva 3/20/25   Yesenia STAFFORD MD       This is my H&P    Physical Exam  Physical Exam  Constitutional:       Comments: Awake   HENT:      Head: Normocephalic.   Cardiovascular:      Rate and Rhythm: Normal rate and regular rhythm.   Pulmonary:       Effort: Pulmonary effort is normal.      Breath sounds: Normal breath sounds.   Abdominal:      General: Bowel sounds are normal.      Palpations: Abdomen is soft.   Neurological:      Mental Status: She is alert.   Psychiatric:         Mood and Affect: Mood normal.           Oropharyngeal Classification II (hard and soft palate, upper portion of tonsils and uvula visible)  ASA PS Classification 3  Sedation Plan Deep  Procedure Plan - pre-procedural (re)assesment completed by physician:  discharge/transfer patient when discharge criteria met    Yadi Mackenzie MD  7/2/2025 9:20 AM           [1]   Allergies  Allergen Reactions    Erythromycin Base Unknown    Tetracycline Unknown   [2]   Past Medical History:  Diagnosis Date    Asthma     Carpal tunnel syndrome, bilateral upper limbs 12/08/2016    Bilateral carpal tunnel syndrome    Paresthesia of skin 11/29/2016    Tingling    Personal history of colonic polyps     History of adenomatous polyp of colon    Personal history of other diseases of the musculoskeletal system and connective tissue     History of degenerative disc disease    Personal history of other specified conditions     History of atypical nevus

## 2025-07-10 ENCOUNTER — APPOINTMENT (OUTPATIENT)
Dept: PRIMARY CARE | Facility: CLINIC | Age: 60
End: 2025-07-10
Payer: COMMERCIAL

## 2025-07-10 VITALS
WEIGHT: 255 LBS | SYSTOLIC BLOOD PRESSURE: 152 MMHG | OXYGEN SATURATION: 98 % | HEIGHT: 65 IN | TEMPERATURE: 98 F | DIASTOLIC BLOOD PRESSURE: 86 MMHG | BODY MASS INDEX: 42.49 KG/M2 | RESPIRATION RATE: 18 BRPM | HEART RATE: 86 BPM

## 2025-07-10 DIAGNOSIS — I10 ESSENTIAL HYPERTENSION: ICD-10-CM

## 2025-07-10 DIAGNOSIS — E04.1 THYROID NODULE: ICD-10-CM

## 2025-07-10 DIAGNOSIS — R73.03 PREDIABETES: Primary | ICD-10-CM

## 2025-07-10 DIAGNOSIS — E04.2 MULTINODULAR GOITER: ICD-10-CM

## 2025-07-10 LAB — POC HEMOGLOBIN A1C: 6.2 % (ref 4.2–6.5)

## 2025-07-10 PROCEDURE — 90471 IMMUNIZATION ADMIN: CPT

## 2025-07-10 PROCEDURE — 1036F TOBACCO NON-USER: CPT

## 2025-07-10 PROCEDURE — 90750 HZV VACC RECOMBINANT IM: CPT

## 2025-07-10 PROCEDURE — 99214 OFFICE O/P EST MOD 30 MIN: CPT

## 2025-07-10 PROCEDURE — 3077F SYST BP >= 140 MM HG: CPT

## 2025-07-10 PROCEDURE — 3008F BODY MASS INDEX DOCD: CPT

## 2025-07-10 PROCEDURE — 3079F DIAST BP 80-89 MM HG: CPT

## 2025-07-10 PROCEDURE — 83036 HEMOGLOBIN GLYCOSYLATED A1C: CPT

## 2025-07-10 NOTE — PROGRESS NOTES
"Subjective   Patient ID: Omaira Keene is a 60 y.o. female who presents for Follow-up (3 month follow. /She has not started the Losartan yet.).    HPI   History of Present Illness  The patient presents today to follow up regarding prediabetes, hypertension, and weight management.    She has been making efforts to manage her prediabetes, including dietary changes such as increasing her protein intake, decreasing carbohydrate burden. However, she reports occasional indulgences in sweets. She has lost 6 pounds without dieting, primarily by monitoring her carbohydrate intake and increasing her protein consumption. She does not consume a lot of processed food and eats a lot of vegetables from her garden. She has been trying to lose weight since she was 10 years old and lost 35 pounds last year with a low-carb diet however subsequently has regained it back.. She has been prescribed metformin but prefers to focus on managing her blood pressure at this time.    Blood pressure monitoring has not been as frequent as recommended, with home readings averaging around 135/95. She has a prescription for losartan but has not yet started taking it.    She has a history of elevated thyroid hormone, diagnosed by one doctor based on lower numbers, while subsequent tests by other doctors have indicated normal thyroid function. She has undergone several tests for this condition and has some nodules, which have been stable and did not require an ultrasound as per her doctor's advice.  Most recent TSH was mildly elevated, has history of nodule, last imaged in 2021    FAMILY HISTORY  Her whole family struggles to lose weight and they all have the same kind of build.       Review of Systems    Objective   /86   Pulse 86   Temp 36.7 °C (98 °F)   Resp 18   Ht 1.651 m (5' 5\")   Wt 116 kg (255 lb)   SpO2 98%   BMI 42.43 kg/m²     Physical Exam  Constitutional:       General: She is not in acute distress.     Appearance: Normal " appearance. She is not ill-appearing.   Eyes:      General: No scleral icterus.  Cardiovascular:      Rate and Rhythm: Normal rate and regular rhythm.      Heart sounds: No murmur heard.     No friction rub. No gallop.   Pulmonary:      Effort: Pulmonary effort is normal. No respiratory distress.      Breath sounds: Normal breath sounds. No wheezing, rhonchi or rales.   Musculoskeletal:      Right lower leg: No edema.      Left lower leg: No edema.   Neurological:      General: No focal deficit present.      Mental Status: She is alert and oriented to person, place, and time.   Psychiatric:         Mood and Affect: Mood normal.         Behavior: Behavior normal.       Physical Exam         Assessment/Plan   Problem List Items Addressed This Visit           ICD-10-CM    Prediabetes - Primary R73.03    Relevant Orders    POCT glycosylated hemoglobin (Hb A1C) manually resulted (Completed)    Multinodular goiter E04.2    Essential hypertension I10     Other Visit Diagnoses         Codes      Thyroid nodule     E04.1    Relevant Orders    US thyroid    Tsh With Reflex To Free T4 If Abnormal          Assessment & Plan  1. Prediabetes.  - A1c levels have decreased by 0.2%, indicating a positive response to the current dietary modifications..  - Weight loss has been noted, which is beneficial for her condition.  - Advised to maintain a daily exercise routine that elevates heart rate to the 120s for approximately 30 minutes, 5 times per week.  - Emphasized the importance of portion control in her diet. A repeat A1c test will be conducted in 6 months.    2. Hypertension.  - Blood pressure readings at home have averaged around 135/95, which is suboptimal.  - Discussed the potential side effects of losartan and reassured that it is generally well-tolerated.  - Encouraged to continue monitoring blood pressure at home.  - Advised to start taking losartan and report back in 3 weeks with home blood pressure readings.    3.  Thyroid  abnormalities.  - Thyroid hormone levels were slightly elevated during the last test.  - No overt symptoms of hypothyroidism reported.  - A repeat thyroid hormone test will be ordered.  - An ultrasound of the thyroid will be scheduled to further evaluate the condition.  -Patient to complete as soon as she is able     Return to office in 6 months or sooner based upon thyroid evaluation or acute concerns  Raul Florence DO       This medical note was created with the assistance of artificial intelligence (AI) for documentation purposes. The content has been reviewed and confirmed by the healthcare provider for accuracy and completeness. Patient consented to the use of audio recording and use of AI during their visit.

## 2025-07-11 LAB
LABORATORY COMMENT REPORT: NORMAL
PATH REPORT.FINAL DX SPEC: NORMAL
PATH REPORT.GROSS SPEC: NORMAL
PATH REPORT.TOTAL CANCER: NORMAL

## 2025-08-12 ENCOUNTER — TELEPHONE (OUTPATIENT)
Dept: PRIMARY CARE | Facility: CLINIC | Age: 60
End: 2025-08-12
Payer: COMMERCIAL

## 2026-02-12 ENCOUNTER — APPOINTMENT (OUTPATIENT)
Dept: PRIMARY CARE | Facility: CLINIC | Age: 61
End: 2026-02-12
Payer: COMMERCIAL